# Patient Record
Sex: MALE | Race: WHITE
[De-identification: names, ages, dates, MRNs, and addresses within clinical notes are randomized per-mention and may not be internally consistent; named-entity substitution may affect disease eponyms.]

---

## 2017-06-29 ENCOUNTER — HOSPITAL ENCOUNTER (EMERGENCY)
Dept: HOSPITAL 17 - PHEFT | Age: 22
Discharge: HOME | End: 2017-06-29
Payer: COMMERCIAL

## 2017-06-29 VITALS
DIASTOLIC BLOOD PRESSURE: 91 MMHG | SYSTOLIC BLOOD PRESSURE: 148 MMHG | RESPIRATION RATE: 16 BRPM | TEMPERATURE: 97.7 F | HEART RATE: 91 BPM | OXYGEN SATURATION: 97 %

## 2017-06-29 VITALS — HEIGHT: 71 IN | BODY MASS INDEX: 33.24 KG/M2 | WEIGHT: 237.44 LBS

## 2017-06-29 VITALS
OXYGEN SATURATION: 97 % | DIASTOLIC BLOOD PRESSURE: 91 MMHG | TEMPERATURE: 97.7 F | HEART RATE: 91 BPM | SYSTOLIC BLOOD PRESSURE: 148 MMHG | RESPIRATION RATE: 16 BRPM
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
## 2017-06-29 DIAGNOSIS — S61.257A: Primary | ICD-10-CM

## 2017-06-29 PROCEDURE — 90714 TD VACC NO PRESV 7 YRS+ IM: CPT

## 2017-06-29 PROCEDURE — 90471 IMMUNIZATION ADMIN: CPT

## 2017-06-29 NOTE — PD
HPI


Chief Complaint:  Bite or Sting


Time Seen by Provider:  21:55


Travel History


International Travel<30 days:  No


Contact w/Intl Traveler<30days:  No


Traveled to known affect area:  No





History of Present Illness


HPI


21-year-old male presents to the emergency room for evaluation of a dog bite to 

his left fifth finger that occurred just prior to arrival.  Patient was skating 

down the road when a dog ran out and bit the pinky finger.  It bled a lot and 

then he ran it under water.  Patient called the police who told him to come to 

the emergency room to have it checked out because the dog's vaccination status 

is unknown.  Patient knows where the dog lives.  Last tetanus was greater than 

5 years ago.





PFSH


Past Medical History


Developmental Delay:  No


Diminished Hearing:  No


Reproductive:  Yes (BRONCHITIS AND PENUMONIA UP UNTIL AGE [age];NONE SINCE 

GETTING YRLY FLU SHOT)


Respiratory:  Yes (RSV AT BIRTH allergies)


Resp. Syncytial Virus (RSV):  Yes (AT 6 1/2 WEEKS OF AGE)


Immunizations Current:  Yes


Seizures:  Yes (FEBRILE SEIZURE AT 4 MO OF AGE)


Tetanus Vaccination:  > 5 Years


Influenza Vaccination:  Yes





Past Surgical History


Oral Surgery:  Yes (wisdom teeth)


Other Surgery:  Yes (deviated septum)





Social History


Alcohol Use:  No


Tobacco Use:  No


Substance Use:  No





Allergies-Medications


(Allergen,Severity, Reaction):  


Coded Allergies:  


     Cat Dander (Verified  Allergy, Severe, ALLERGIES, CONGESTION, 6/29/17)


Reported Meds & Prescriptions





Reported Meds & Active Scripts


Active


Reported


[allergy med]   1 Tab PO DAILY 


Vitamin D (Ergocalciferol) 8,000 Mg/Ml  Sol 5,000 Units PO DAILY 








Review of Systems


Except as stated in HPI:  all other systems reviewed are Neg





Physical Exam


Narrative


GENERAL: Well-nourished, well-developed male in no acute distress.  Afebrile.  

Ambulatory.


SKIN: Focused skin assessment warm/dry.  There is a deep puncture wound to left 

fifth finger on the distal phalanx on the radial side.  Slightly bleeding.


HEAD: Normocephalic.


EYES: No scleral icterus. No injection or drainage. 


NECK: Supple, trachea midline. No JVD or lymphadenopathy.


CARDIOVASCULAR: Regular rate and rhythm without murmurs, gallops, or rubs. 


RESPIRATORY: Breath sounds equal bilaterally. No accessory muscle use.


EXTREMITY: Full range of motion in all joints. No joint swelling/injury. Distal 

extremity neurovascularly intact with intact two point discrimination.  

Nontender.





Data


Data


Last Documented VS





Vital Signs








  Date Time  Temp Pulse Resp B/P Pulse Ox O2 Delivery O2 Flow Rate FiO2


 


6/29/17 21:26 97.7 91 16 148/91 97   








Orders





 Amoxicil-Clavulanate (Augmentin) (6/29/17 22:00)


Tetanus/Diphtheria Tox Adult (Tetanus/Di (6/29/17 22:00)








MDM


Medical Decision Making


Medical Screen Exam Complete:  Yes


Emergency Medical Condition:  Yes


Medical Record Reviewed:  Yes


Differential Diagnosis


Dog bite, cellulitis, puncture wound, laceration


Narrative Course


21-year-old male presents to the emergency room for evaluation of dog bite to 

the left fifth finger that occurred just prior to arrival.  Dog's vaccination 

status is unknown but dog is able to be monitored/observed.  Patient was 

updated on tetanus.  Physical exam reveals a puncture wound to the left fifth 

finger.  Nonbleeding.  Neurovascularly intact distally.  Wound was thoroughly 

cleansed.  Patient given first dose of Augmentin here.  Discharged with 

prescription for the same.  Told to follow up with PCP or return for worsening 

symptoms.  Understands and agrees to plan.





Diagnosis





 Primary Impression:  


 Dog bite of finger


 Qualified Code:  S61.259A - Dog bite of finger, initial encounter


Referrals:  


Primary Care Physician


Patient Instructions:  Animal Bite (ED), General Instructions





***Additional Instructions:


Rest and drink plenty of fluids.


Keep wound clean and dry.  Apply triple antibiotic ointment daily until healed.


Take ibuprofen with food as directed, as needed for pain.


Augmentin as directed, until gone.


Follow-up with a primary care physician.


Return to the emergency room for worsening symptoms.


Disposition:  01 DISCHARGE HOME


Condition:  Stable








Mary Estrada Jun 29, 2017 22:17

## 2017-07-01 ENCOUNTER — HOSPITAL ENCOUNTER (EMERGENCY)
Dept: HOSPITAL 17 - PHED | Age: 22
Discharge: HOME | End: 2017-07-01
Payer: COMMERCIAL

## 2017-07-01 VITALS — HEIGHT: 71 IN | BODY MASS INDEX: 33.02 KG/M2 | WEIGHT: 235.89 LBS

## 2017-07-01 VITALS
TEMPERATURE: 98.1 F | SYSTOLIC BLOOD PRESSURE: 118 MMHG | OXYGEN SATURATION: 99 % | DIASTOLIC BLOOD PRESSURE: 85 MMHG | HEART RATE: 91 BPM | RESPIRATION RATE: 15 BRPM

## 2017-07-01 DIAGNOSIS — R42: Primary | ICD-10-CM

## 2017-07-01 PROCEDURE — 99284 EMERGENCY DEPT VISIT MOD MDM: CPT

## 2017-07-01 NOTE — PD
HPI


Chief Complaint:  Dizziness


Time Seen by Provider:  14:38


Travel History


International Travel<30 days:  No


Contact w/Intl Traveler<30days:  No


Traveled to known affect area:  No





History of Present Illness


HPI


21-year-old male complains of dizziness with nausea.  Patient states that the 

symptoms started yesterday evening.  Patient states that he is feeling 

sensation of things spinning around him.  Patient states the dizziness is worse 

with head movement.  Patient denies any headache.  Patient denies any visual 

change.  Patient denies any neck pain.  Patient denies any chest pain or 

shortness of breath.  Patient denies abdominal pain.  Patient denies any focal 

weakness or numbness of extremity.  Patient denies any fever chills.





PFSH


Past Medical History


Developmental Delay:  No


Diminished Hearing:  No


Reproductive:  Yes (BRONCHITIS AND PENUMONIA UP UNTIL AGE F[age];NONE SINCE 

GETTING YRLY FLU SHOT)


Respiratory:  Yes (RSV AT BIRTH allergies)


Resp. Syncytial Virus (RSV):  Yes (AT 6 1/2 WEEKS OF AGE)


Immunizations Current:  Yes


Seizures:  Yes (FEBRILE SEIZURE AT 4 MO OF AGE)


Pregnant?:  Not Pregnant





Past Surgical History


Oral Surgery:  Yes (wisdom teeth)


Other Surgery:  Yes (deviated septum)





Social History


Alcohol Use:  No


Tobacco Use:  No


Substance Use:  No





Allergies-Medications


(Allergen,Severity, Reaction):  


Coded Allergies:  


     Cat Dander (Verified  Allergy, Severe, ALLERGIES, CONGESTION, 7/1/17)


Reported Meds & Prescriptions





Reported Meds & Active Scripts


Active


Augmentin (Amoxicillin-Clavulanate) 875-125 Mg Tab 1 Tab PO BID 7 Days


Reported


[allergy med]   1 Tab PO DAILY


Vitamin D (Ergocalciferol) 8,000 Mg/Ml  Sol 5,000 Units PO DAILY








Review of Systems


General / Constitutional:  No: Fever


Eyes:  No: Visual changes


HENT:  Positive: Vertigo, Lightheadedness,  No: Headaches


Cardiovascular:  No: Chest Pain or Discomfort


Respiratory:  No: Shortness of Breath


Gastrointestinal:  No: Abdominal Pain


Genitourinary:  No: Dysuria


Musculoskeletal:  No: Pain


Skin:  No Rash


Neurologic:  No: Weakness


Psychiatric:  No: Depression


Endocrine:  No: Polydipsia


Hematologic/Lymphatic:  No: Easy Bruising





Physical Exam


Narrative


GENERAL: Well-nourished, well-developed patient.


SKIN: Focused skin assessment warm/dry.


HEAD: Normocephalic.


EYES: No scleral icterus. No injection or drainage.  Pupils 3 mm equal reactive.


NECK: Supple, trachea midline. No JVD or lymphadenopathy.


CARDIOVASCULAR: Regular rate and rhythm without murmurs, gallops, or rubs. 


RESPIRATORY: Breath sounds equal bilaterally. No accessory muscle use.


GASTROINTESTINAL: Abdomen soft, non-tender, nondistended. 


MUSCULOSKELETAL: No cyanosis, or edema. 


BACK: Nontender without obvious deformity. No CVA tenderness. 


Neurologic exam: Patient is awake and alert oriented 3.  No obvious focal 

neurological deficit.





Data


Data


Last Documented VS





Vital Signs








  Date Time  Temp Pulse Resp B/P Pulse Ox O2 Delivery O2 Flow Rate FiO2


 


7/1/17 14:31 98.1 91 15 118/85 99   











MDM


Medical Decision Making


Medical Screen Exam Complete:  Yes


Emergency Medical Condition:  Yes


Differential Diagnosis


Differential diagnosis including acute vertigo, dehydration, electrolyte 

abnormality, TIA, CVA,


Narrative Course


21-year-old male with dizziness.  Symptoms typical of positional vertigo.  

Meclizine 25 mg by mouth given.





Diagnosis





 Primary Impression:  


 Acute onset of severe vertigo


Patient Instructions:  General Instructions





***Additional Instructions:


Meclizine as needed.  Follow-up with personal physician.  Return if persistent 

problem or worse.


***Med/Other Pt SpecificInfo:  Prescription(s) given


Scripts


Ondansetron Odt (Zofran Odt)4 Mg Tab4 Mg SL Q6HR PRN (Nausea/Vomiting) #10 TAB


   Prov:Zachary Monge MD         7/1/17 


Meclizine 25 Mg Tab25 Mg PO TID PRN (VERTIGO) #21 TAB


   Prov:Zachary Monge MD         7/1/17


Disposition:  01 DISCHARGE HOME


Condition:  Stable








Zachary Monge MD Jul 1, 2017 14:51

## 2018-06-22 ENCOUNTER — HOSPITAL ENCOUNTER (EMERGENCY)
Dept: HOSPITAL 17 - PHEFT | Age: 23
Discharge: HOME | End: 2018-06-22
Payer: COMMERCIAL

## 2018-06-22 VITALS — WEIGHT: 235.89 LBS | BODY MASS INDEX: 31.95 KG/M2 | HEIGHT: 72 IN

## 2018-06-22 VITALS
OXYGEN SATURATION: 98 % | DIASTOLIC BLOOD PRESSURE: 78 MMHG | SYSTOLIC BLOOD PRESSURE: 147 MMHG | HEART RATE: 84 BPM | TEMPERATURE: 97.5 F | RESPIRATION RATE: 16 BRPM

## 2018-06-22 DIAGNOSIS — L55.1: Primary | ICD-10-CM

## 2018-06-22 PROCEDURE — 99283 EMERGENCY DEPT VISIT LOW MDM: CPT

## 2018-06-22 NOTE — PD
HPI


Chief Complaint:  Burn


Time Seen by Provider:  09:45


Travel History


International Travel<30 days:  No


Contact w/Intl Traveler<30days:  No


Traveled to known affect area:  No





History of Present Illness


HPI


22-year-old male presents emergency department for evaluation of a sunburn to 

the shoulders and chest that he obtain a Wednesday.  Patient says that he was 

out in the sun for several hours without sunscreen without a shirt and he 

developed sunburn.  Says he has been using over-the-counter lotions to reduce 

symptoms however, and is not improved.  Says the pain is mild-moderate, 

nonradiating. He denies significant swelling or inability to move his upper 

extremities. Denies fever or chills. Says he has had a sunburn previously but 

never this bad. He has no other complaints today.





PFSH


Past Medical History


Medical History:  Denies Significant Hx


Developmental Delay:  No


Diminished Hearing:  No


Reproductive:  Yes (BRONCHITIS AND PENUMONIA UP UNTIL AGE F[age];NONE SINCE 

GETTING YRLY FLU SHOT)


Respiratory:  Yes (RSV AT BIRTH allergies)


Resp. Syncytial Virus (RSV):  Yes (AT 6 1/2 WEEKS OF AGE)


Immunizations Current:  Yes


Seizures:  Yes (FEBRILE SEIZURE AT 4 MO OF AGE)





Past Surgical History


Oral Surgery:  Yes (wisdom teeth)


Other Surgery:  Yes (deviated septum)





Social History


Alcohol Use:  No


Tobacco Use:  No


Substance Use:  No





Allergies-Medications


(Allergen,Severity, Reaction):  


Coded Allergies:  


     cat dander (Unverified  Allergy, Severe, ALLERGIES, CONGESTION, 6/22/18)


Reported Meds & Prescriptions





Reported Meds & Active Scripts


Active


Silvadene Topical (Silver Sulfadiazine) 1 % Cream 1 Applic TOPICAL BID 5 Days








Review of Systems


Except as stated in HPI:  all other systems reviewed are Neg





Physical Exam


Narrative


GENERAL: Well-nourished, well-developed patient, in NAD


SKIN: Focused skin assessment warm/dry. upper shoulders and chest with erythema

, blanching with pressure. small, 1-2mm vesicles diffuse across shoulders, TTP. 

skin is supple. No open sores present.


HEAD: Normocephalic. Atraumatic.


EYES: No scleral icterus. No injection or drainage.  


THROAT: Airway is patent.


NECK: Supple, trachea midline. No JVD. No meningismus.


CARDIOVASCULAR: Regular rate and rhythm without murmurs, gallops, or rubs. 


RESPIRATORY: Breath sounds equal bilaterally. No accessory muscle use. No 

wheezes, rales, or rhonchi


MUSCULOSKELETAL: No cyanosis, or edema. 


BACK: Nontender without obvious deformity. No CVA tenderness.





Data


Data


Last Documented VS





Vital Signs








  Date Time  Temp Pulse Resp B/P (MAP) Pulse Ox O2 Delivery O2 Flow Rate FiO2


 


6/22/18 09:39 97.5 84 16 147/78 (101) 98   








Orders





 Orders


Ed Discharge Order (6/22/18 09:53)








MDM


Medical Decision Making


Medical Screen Exam Complete:  Yes


Emergency Medical Condition:  Yes


Differential Diagnosis


First-degree burn, second-degree burn, third-degree burn, erysipelas, impetigo


Narrative Course


22-year-old male presents emergency department for evaluation of a sunburn that 

he obtained on Wednesday.  Patient says he has had some burns previously however

, is the worse he has had and is concerned.


Vital signs are stable.


His exam findings most consistent with a second-degree burn.  No open lesions 

or vesicles that are at risk for opening.  Multiple, small vesicles of the 

upper shoulders not evidence of infection or discharge.


Patient be discharged with Silvadene.  He is advised to continue ibuprofen or 

Tylenol for pain.  Advised to consider antihistamine such as Benadryl to reduce 

his pain.


Avoid sun exposure until completely healed.  Consider dermatology to evaluate 

further.





Diagnosis





 Primary Impression:  


 Sunburn, second degree


Referrals:  


Primary Care Physician





***Additional Instructions:  


Use Silvadene up to twice a day to reduce the possibility of getting an 

infection.


Continue anti-inflammatory to reduce her symptoms.


He may consider using Benadryl also to reduce inflammation.


Consider using over-the-counter aloe with lidocaine to reduce her symptoms.


Avoid sun exposure into the areas until completely healed.  Consider follow-up 

with a dermatologist if her symptoms persist or worsen.


Scripts


Silver Sulfadiazine Topical (Silvadene Topical) 1 % Cream


1 APPLIC TOPICAL BID for Wound Management for 5 Days, #400 GM 0 Refills


   Prov: Delores Ibanez MD         6/22/18


Disposition:  01 DISCHARGE HOME


Condition:  Stable











Echo Sunshine Jun 22, 2018 09:53